# Patient Record
Sex: MALE | Race: OTHER | Employment: OTHER | URBAN - METROPOLITAN AREA
[De-identification: names, ages, dates, MRNs, and addresses within clinical notes are randomized per-mention and may not be internally consistent; named-entity substitution may affect disease eponyms.]

---

## 2017-01-30 ENCOUNTER — OFFICE VISIT (OUTPATIENT)
Dept: RHEUMATOLOGY | Age: 67
End: 2017-01-30

## 2017-01-30 VITALS
SYSTOLIC BLOOD PRESSURE: 150 MMHG | BODY MASS INDEX: 26.17 KG/M2 | HEIGHT: 70 IN | WEIGHT: 182.8 LBS | OXYGEN SATURATION: 97 % | DIASTOLIC BLOOD PRESSURE: 95 MMHG | HEART RATE: 88 BPM | TEMPERATURE: 98 F

## 2017-01-30 DIAGNOSIS — M35.3 PMR (POLYMYALGIA RHEUMATICA) (HCC): Primary | ICD-10-CM

## 2017-01-30 RX ORDER — CAL/D3/MAG11/ZINC/COP/MANG/BOR 600 MG-800
TABLET ORAL
Refills: 3 | COMMUNITY
Start: 2016-12-12 | End: 2019-10-23 | Stop reason: CLARIF

## 2017-01-30 RX ORDER — PREDNISONE 1 MG/1
TABLET ORAL
Refills: 3 | COMMUNITY
Start: 2017-01-20 | End: 2017-09-05 | Stop reason: ALTCHOICE

## 2017-01-30 NOTE — PROGRESS NOTES
RHEUMATOLOGY PROBLEM LIST AND CHIEF COMPLAINT  1. Polymyalgia Rheumatica - arthralgia and stiffness of shoulders, back, knee, and hands. Elevated CRP. INTERVAL HISTORY  This is a 77 y.o.  male. Today, the patient complains of pain in the joints. Severity: 2 on a scale of 0-10  Timing: all day   Context/Associated signs and symptoms: The patient states that he feels the same today. He complains of soreness and stiffness in his shoulders, back, hands and legs, but his hand and back symptoms are not daily. He complains that he is still having occasional headaches. He notes that his nightly shoulder pain is his worse pain, causing him trouble to sleep on his sides. He continues on prednisone 8 mg daily with relief, but wants to get off prednisone. Her did not taper a directed on previous visit. RHEUMATOLOGY REVIEW OF SYSTEMS   Positives as per history  Negatives as follows:  Meghna Jadiel:  Denies unexplained persistent fevers or weight change  RESPIRATORY:  No pleuritic pain, exertional dyspnea  CARDIOVASCULAR:  Denies chest pain  GASTRO:   Denies heartburn, abdominal pain, nausea, vomiting, diarrhea  SKIN:    Denies rash   MSK:    No persistent joint swelling    PAST MEDICAL HISTORY  Reviewed with patient, significant changes in medical history - no    FAMILY HISTORY  None    PHYSICAL EXAM  Blood pressure (!) 150/95, pulse 88, temperature 98 °F (36.7 °C), temperature source Oral, height 5' 10\" (1.778 m), weight 182 lb 12.8 oz (82.9 kg), SpO2 97 %. GENERAL APPEARANCE: Well-nourished, no acute distress  NECK: No adenopathy  ENT: No oral ulcers  CARDIOVASCULAR: Heart rhythm is regular. No murmur, rub, gallop  CHEST: Normal vesicular breath sounds. No wheezes, rales, pleural friction rubs  ABDOMINAL: The abdomen is soft and nontender.  Bowel sounds are normal  SKIN: No rash, palpable purpura, digital ulcer, abnormal thickening   MUSCULOSKELETAL:   Upper extremities - full range of motion, no tenderness, no swelling, no synovial thickening and no deformity of joints  Lower extremities - full range of motion, no tenderness, no swelling, no synovial thickening and no deformity of joints     LABS, RADIOLOGY AND PROCEDURES  Previous labs reviewed -Yes    ASSESSMENT  1. Polymyalgia Rheumatica - (Establised problem - Progressive disease) - The patient has responded to prednisone. He still has some morning stiffness, arthralgia, and headaches that occur intermittently. I explained that his current dose of prednisone is not very high and that he should not experience many side effects at his current dose. However, I would like for him to taper his prednisone to 5 mg daily or less, so I want him to taper his prednisone to 7 mg daily starting tomorrow. He should then taper this by 1 mg every 2 weeks. I explained that if he begins to have worsening pain, morning stiffness, and fatigue while tapering his prednisone he should call to discuss this. If he is unable to tolerate his prednisone, I will likely start him on stronger medication. I recommended that he continue calcium and vitamin D for his bone health since he is on long term steroids. He should return in 1 month for a follow up. PLAN  1. Prednisone 7 mg daily for 2 weeks then taper by 1 mg q2 weeks   2. Calcium and Vitamin D  3. Return in 1 month    Viji Brasher MD, [address] [place] [address]   Adult and Pediatric Rheumatology     [address] [place] [address], Phone [phone], Fax [phone]     Visiting  of [address] [place] [address] Ngozi Wesley Brock [address], Phone [phone], Fax [phone]    cc:  Tolu Holder MD    Written by venessa Wen, as dictated by Rut Wheeler.  Virgil Brasher M.D.

## 2017-01-30 NOTE — PATIENT INSTRUCTIONS
Prednisone 7mg x 2 weeks  Prednisone 6mg x 2 weeks  Prednisone 5mg x 2 weeks  Prednisone 4mg x 2 weeks  Prednisone 3mg x 2 weeks  Prednisone 2mg x 2 weeks  Prednisone 1mg x 2 weeks

## 2017-01-30 NOTE — MR AVS SNAPSHOT
Visit Information Date & Time Provider Department Dept. Phone Encounter #  
 1/30/2017  1:20 PM Doc Wilson MD [facility] [id number] Follow-up Instructions Return in about 1 month (around 2/28/2017). Upcoming Health Maintenance Date Due DTaP/Tdap/Td series (1 - Tdap) 11/14/1971 FOBT Q 1 YEAR AGE 50-75 11/14/2000 ZOSTER VACCINE AGE 60> 11/14/2010 GLAUCOMA SCREENING Q2Y 11/14/2015 Pneumococcal 65+ Low/Medium Risk (1 of 2 - PCV13) 11/14/2015 MEDICARE YEARLY EXAM 11/14/2015 INFLUENZA AGE 9 TO ADULT 8/1/2016 Allergies as of 1/30/2017  Review Complete On: 1/30/2017 By: Ashlie Singh LPN Severity Noted Reaction Type Reactions Codeine  09/02/2016    Hives Current Immunizations  Never Reviewed No immunizations on file. Not reviewed this visit Vitals BP Pulse Temp Height(growth percentile) Weight(growth percentile) SpO2  
 (!) 150/95 (BP 1 Location: Right arm, BP Patient Position: Sitting) 88 98 °F (36.7 °C) (Oral) 5' 10\" (1.778 m) 182 lb 12.8 oz (82.9 kg) 97% BMI Smoking Status 26.23 kg/m2 Former Smoker Vitals History BMI and BSA Data Body Mass Index Body Surface Area  
 26.23 kg/m 2 2.02 m 2 Preferred Pharmacy Pharmacy Name Phone [id number] [phone] [facility], [address] [place] [address]  [phone] Your Updated Medication List  
  
   
This list is accurate as of: 1/30/17  1:30 PM.  Always use your most recent med list.  
  
  
  
  
 ALPRAZolam 0.25 mg tablet Commonly known as:  XANAX  
TAKE 1 TABLET BY MOUTH TWICE A DAY  
  
 CALTRATE 600+D PLUS MINERALS 600 mg calcium- 800 unit-50 mg Tab Generic drug:  Ca-D3-mag#11-zinc-cupr-man-bor TAKE 1 TABLET BY MOUTH TWICE A DAY  
  
 celecoxib 200 mg capsule Commonly known as:  CELEBREX  
TAKE ONE CAPSULE BY MOUTH EVERY DAY  
  
 omeprazole 40 mg capsule Commonly known as:  PRILOSEC  
TAKE 1 CAPSULE BY MOUTH EVERY MORNING BEFORE MEALS  
  
 predniSONE 1 mg tablet Commonly known as:  DELTASONE  
TAKE 8 TABLETS BY MOUTH DAILY STIOLTO RESPIMAT 2.5-2.5 mcg/actuation Mist  
Generic drug:  tiotropium-olodaterol USE 2 INHALATIONS EVERY DAY Follow-up Instructions Return in about 1 month (around 2/28/2017). Patient Instructions Prednisone 7mg x 2 weeks Prednisone 6mg x 2 weeks Prednisone 5mg x 2 weeks Prednisone 4mg x 2 weeks Prednisone 3mg x 2 weeks Prednisone 2mg x 2 weeks Prednisone 1mg x 2 weeks Please provide this summary of care documentation to your next provider. Your primary care clinician is listed as MISTY Hilario. If you have any questions after today's visit, please call [phone].

## 2017-03-01 ENCOUNTER — OFFICE VISIT (OUTPATIENT)
Dept: RHEUMATOLOGY | Age: 67
End: 2017-03-01

## 2017-03-01 VITALS
OXYGEN SATURATION: 98 % | RESPIRATION RATE: 18 BRPM | HEART RATE: 65 BPM | WEIGHT: 184.2 LBS | DIASTOLIC BLOOD PRESSURE: 68 MMHG | HEIGHT: 70 IN | BODY MASS INDEX: 26.37 KG/M2 | SYSTOLIC BLOOD PRESSURE: 143 MMHG | TEMPERATURE: 97 F

## 2017-03-01 DIAGNOSIS — M35.3 PMR (POLYMYALGIA RHEUMATICA) (HCC): ICD-10-CM

## 2017-03-01 DIAGNOSIS — S43.422A SPRAIN OF LEFT ROTATOR CUFF CAPSULE, INITIAL ENCOUNTER: Primary | ICD-10-CM

## 2017-03-01 RX ORDER — GLUCOSAMINE SULFATE 1500 MG
1000 POWDER IN PACKET (EA) ORAL DAILY
COMMUNITY

## 2017-03-01 NOTE — MR AVS SNAPSHOT
Visit Information Date & Time Provider Department Dept. Phone Encounter #  
 3/1/2017  8:20 AM Oren Castellanos MD [facility] [id number] Upcoming Health Maintenance Date Due DTaP/Tdap/Td series (1 - Tdap) 11/14/1971 FOBT Q 1 YEAR AGE 50-75 11/14/2000 ZOSTER VACCINE AGE 60> 11/14/2010 GLAUCOMA SCREENING Q2Y 11/14/2015 Pneumococcal 65+ Low/Medium Risk (1 of 2 - PCV13) 11/14/2015 MEDICARE YEARLY EXAM 11/14/2015 INFLUENZA AGE 9 TO ADULT 8/1/2016 Allergies as of 3/1/2017  Review Complete On: 3/1/2017 By: Katharina Younger LPN Severity Noted Reaction Type Reactions Codeine  09/02/2016    Hives Current Immunizations  Never Reviewed No immunizations on file. Not reviewed this visit You Were Diagnosed With   
  
 Codes Comments Sprain of left rotator cuff capsule, initial encounter    -  Primary ICD-10-CM: M61.781U ICD-9-CM: 840.4 PMR (polymyalgia rheumatica) (HCC)     ICD-10-CM: M35.3 ICD-9-CM: 373 Vitals BP  
  
  
  
  
  
 143/68 (BP 1 Location: Right arm, BP Patient Position: Sitting) Vitals History BMI and BSA Data Body Mass Index Body Surface Area  
 26.43 kg/m 2 2.03 m 2 Preferred Pharmacy Pharmacy Name Phone [id number] [phone] [facility], [address] [place] [address]  [phone] Your Updated Medication List  
  
   
This list is accurate as of: 3/1/17  9:08 AM.  Always use your most recent med list.  
  
  
  
  
 ALPRAZolam 0.25 mg tablet Commonly known as:  XANAX  
TAKE 1 TABLET BY MOUTH TWICE A DAY  
  
 CALTRATE 600+D PLUS MINERALS 600 mg calcium- 800 unit-50 mg Tab Generic drug:  Ca-D3-mag#11-zinc-cupr-man-bereket No Longer Taking  
  
 celecoxib 200 mg capsule Commonly known as:  CELEBREX  
TAKE ONE CAPSULE BY MOUTH EVERY DAY  
  
 omeprazole 40 mg capsule Commonly known as:  PRILOSEC  
 TAKE 1 CAPSULE BY MOUTH EVERY MORNING BEFORE MEALS  
  
 predniSONE 1 mg tablet Commonly known as:  Merle Torrey Takes 5 mg Daily STIOLTO RESPIMAT 2.5-2.5 mcg/actuation Mist  
Generic drug:  tiotropium-olodaterol USE 2 INHALATIONS EVERY DAY  
  
 VITAMIN D3 1,000 unit Cap Generic drug:  cholecalciferol Take 1,000 Units by mouth daily. We Performed the Following REFERRAL TO PHYSICAL THERAPY [XDN89 Custom] Dominga Restrepo [address] [address] / [phone] [address] [facility] [address] [facility], [address] [facility] / [phone] [address] [place] [address] [address] [facility] / [phone] [address], [facility] / [phone] Referral Information Referral ID Referred By Referred To  
  
 [id number] ASI[place], [address] [facility] [place]   
   [address] [address] Phone: [phone] Fax: [phone] Visits Status Start Date End Date 1 New Request 3/1/17 3/1/18 If your referral has a status of pending review or denied, additional information will be sent to support the outcome of this decision. Please provide this summary of care documentation to your next provider. Your primary care clinician is listed as MISTY Hilario. If you have any questions after today's visit, please call [phone].

## 2017-03-01 NOTE — LETTER
NOTIFICATION RETURN TO WORK / SCHOOL 
 
3/1/2017 9:15 AM 
 
 
 
Mr. Amanda Blanton 
[id number] [facility] [phone] To Whom It May Concern: 
 
Amanda Blanton is currently under the care of Smita De Jesus. He will return to work/school on: March 2, 2017. If there are questions or concerns please have the patient contact our office. Sincerely, Luis Oconnor MD

## 2017-03-01 NOTE — PROGRESS NOTES
RHEUMATOLOGY PROBLEM LIST AND CHIEF COMPLAINT  1. Polymyalgia Rheumatica - arthralgia and stiffness of shoulders, back, knee, and hands. Elevated CRP. INTERVAL HISTORY  This is a 77 y.o.  male. Today, the patient complains of pain in the joints. Location: shoulder  Severity:  4 on a scale of 0-10  Timing: all day   Duration:  1 month  Modifying factors: prednisone  Context/Associated signs and symptoms: The patient has tapered his prednisone to 5 mg daily and states that he is feeling a little better. He complains that his left shoulder still has pain that is worse than his right shoulder and that he has trouble raising his left arm because of his shoulder pain. He also complains that he still has some pain in his hips. He notes that he has been bruising easily and reports that when he gets tired his eye sight gets \"a little fuzzy. \"    RHEUMATOLOGY REVIEW OF SYSTEMS   Positives as per history  Negatives as follows:  Savannahnorma Janettow:  Denies unexplained persistent fevers or weight change  RESPIRATORY:  No pleuritic pain, exertional dyspnea  CARDIOVASCULAR:  Denies chest pain  GASTRO:   Denies heartburn, abdominal pain, nausea, vomiting, diarrhea  SKIN:    Denies rash   MSK:    No persistent joint swelling    PAST MEDICAL HISTORY  Reviewed with patient, significant changes in medical history - no    FAMILY HISTORY   No autoimmune disease in the family    PHYSICAL EXAM  Blood pressure 143/68, pulse 65, temperature 97 °F (36.1 °C), temperature source Oral, resp. rate 18, height 5' 10\" (1.778 m), weight 184 lb 3.2 oz (83.6 kg), SpO2 98 %. GENERAL APPEARANCE: Well-nourished, no acute distress  NECK: No adenopathy  ENT: No oral ulcers  CARDIOVASCULAR: Heart rhythm is regular. No murmur, rub, gallop  CHEST: Normal vesicular breath sounds. No wheezes, rales, pleural friction rubs  ABDOMINAL: The abdomen is soft and nontender.  Bowel sounds are normal  SKIN: No rash, palpable purpura, digital ulcer, abnormal thickening   MUSCULOSKELETAL: Rotator cuff signs of the left shoulder - unable to raise left arm above midline  Upper extremities - full range of motion, no tenderness, no swelling, no synovial thickening and no deformity of joints   Lower extremities - full range of motion, no tenderness, no swelling, no synovial thickening and no deformity of joints     LABS, RADIOLOGY AND PROCEDURES  Previous labs reviewed -Yes    ASSESSMENT  1. Polymyalgia Rheumatica - (Establised problem - Progressive disease) - The patient has responded to prednisone. He is doing well and has tapered his prednisone to 5 mg daily without flaring. After examining the patient, I believe that his left shoulder pain is from a rotator cuff injury, not his PMR. He should continue tapering his prednisone by 1 mg every 2 weeks and return in 1 month for a follow up. 2. Rotator cuff disease - The patient likely has a partial tear of his left rotator cuff as he has significant pain when moving his left and is unable to raise his left arm above the midline. I have recommended that he do physical therapy for this. I will also give him a steroid injection in his left shoulder today. PLAN  1. Prednisone continue to taper by 1 mg q2 weeks   2. Calcium and Vitamin D  3. Left shoulder steroid injection  4. Recommend physical therapy  5. Return in 1 month    Viji Bonilla MD, [address] [place] [address]   Adult and Pediatric Rheumatology     [address] [place] [address], Phone [phone], Fax [phone]     Visiting  of [address] [place] [address] Ngozi Grey [address], Phone [phone], Fax [phone]    cc:  Tatum Khoury MD    Written by venessa Shetty, as dictated by Gm Llanos. Frandy Bonilla M.D.     [facility]  OFFICE PROCEDURE PROGRESS NOTE        Chart reviewed for the following:   Bradly Ann, have reviewed the History, Physical and updated the Allergic reactions for [address] performed immediately prior to start of procedure:   I, Annabelle Rosales, have performed the following reviews on Tristan Mix prior to the start of the procedure:            * Patient was identified by name and date of birth   * Agreement on procedure being performed was verified  * Risks and Benefits explained to the patient  * Procedure site verified and marked as necessary  * Patient was positioned for comfort  * Consent was signed and verified     Time: 9:00 am      Date of procedure: 3/1/2017    Procedure performed by: Annabelle Rosales MD    Provider assisted by: none    Patient assisted by: self    How tolerated by patient: tolerated the procedure well with no complications    Post Procedural Pain Scale: 0 - No Hurt    Comments: none      PROCEDURE  After consent was obtained, using sterile technique the left shoulder was prepped and Depo-medrol 40 mg and 1ml of lidocaine was then injected and the needle withdrawn. The procedure was well tolerated. The patient is asked to continue to rest for 24 hours before resuming regular activities. It may be more painful for the first 1-2 days. Watch for fever, or increased swelling or persistent pain. Call or return to clinic prn if such symptoms occur.

## 2017-03-02 RX ORDER — LIDOCAINE HYDROCHLORIDE 10 MG/ML
1 INJECTION INFILTRATION; PERINEURAL ONCE
Qty: 1 VIAL | Refills: 0
Start: 2017-03-02 | End: 2017-03-02

## 2017-05-01 ENCOUNTER — OFFICE VISIT (OUTPATIENT)
Dept: RHEUMATOLOGY | Age: 67
End: 2017-05-01

## 2017-05-01 VITALS
HEART RATE: 68 BPM | DIASTOLIC BLOOD PRESSURE: 83 MMHG | SYSTOLIC BLOOD PRESSURE: 143 MMHG | OXYGEN SATURATION: 97 % | BODY MASS INDEX: 26.14 KG/M2 | TEMPERATURE: 98 F | WEIGHT: 182.6 LBS | RESPIRATION RATE: 18 BRPM | HEIGHT: 70 IN

## 2017-05-01 DIAGNOSIS — M19.011 PRIMARY OSTEOARTHRITIS OF BOTH SHOULDERS: ICD-10-CM

## 2017-05-01 DIAGNOSIS — M35.3 PMR (POLYMYALGIA RHEUMATICA) (HCC): Primary | ICD-10-CM

## 2017-05-01 DIAGNOSIS — M19.012 PRIMARY OSTEOARTHRITIS OF BOTH SHOULDERS: ICD-10-CM

## 2017-05-01 RX ORDER — ALBUTEROL SULFATE 90 UG/1
AEROSOL, METERED RESPIRATORY (INHALATION)
Refills: 5 | COMMUNITY
Start: 2017-03-28

## 2017-05-01 NOTE — PROGRESS NOTES
RHEUMATOLOGY PROBLEM LIST AND CHIEF COMPLAINT  1. Polymyalgia Rheumatica - arthralgia and stiffness of shoulders, back, knee, and hands. Elevated CRP. INTERVAL HISTORY  This is a 77 y.o.  male. Today, the patient complains of pain in the joints. Location: shoulder, wrist, lumbar spine  Severity:  3 on a scale of 0-10  Timing: all day   Duration:  1 month  Modifying factors: Depo-medrol  Context/Associated signs and symptoms: The patient states that his left shoulder has improved since his steroid injection. He has less pain in his shoulder in the mornings and he now has more range of motion. However, he notes that he is still unable to sleep on his left side because of his shoulder pain. He complains of pain in his lower back after activity and pain in his wrists. He states that he has pain throughout the day, but he believes that some of his pain is from his degenerative arthritis. He denies morning stiffness or chest pain. He admits to occasional headaches that do not persist for very long, but he is unsure if this temporal artery pain. He continues on prednisone 1 mg daily     RHEUMATOLOGY REVIEW OF SYSTEMS   Positives as per history  Negatives as follows:  CONSTITUTlONAL:  Denies unexplained persistent fevers or weight change  RESPIRATORY:  No pleuritic pain, exertional dyspnea  CARDIOVASCULAR:  Denies chest pain  GASTRO:   Denies heartburn, abdominal pain, nausea, vomiting, diarrhea  SKIN:    Denies rash   MSK:    No persistent joint swelling    PAST MEDICAL HISTORY  Reviewed with patient, significant changes in medical history - no    FAMILY HISTORY   No autoimmune disease in the family    PHYSICAL EXAM  Blood pressure 143/83, pulse 68, temperature 98 °F (36.7 °C), temperature source Oral, resp. rate 18, height 5' 10\" (1.778 m), weight 182 lb 9.6 oz (82.8 kg), SpO2 97 %.   GENERAL APPEARANCE: Well-nourished, no acute distress  NECK: No adenopathy  ENT: No oral ulcers  CARDIOVASCULAR: Heart rhythm is regular. No murmur, rub, gallop  CHEST: Normal vesicular breath sounds. No wheezes, rales, pleural friction rubs  ABDOMINAL: The abdomen is soft and nontender. Bowel sounds are normal  SKIN: No rash, palpable purpura, digital ulcer, abnormal thickening   MUSCULOSKELETAL: Rotator cuff signs of the left shoulder - improved  Upper extremities - full range of motion, no tenderness, no swelling, no synovial thickening and no deformity of joints   Lower extremities - full range of motion, no tenderness, no swelling, no synovial thickening and no deformity of joints     LABS, RADIOLOGY AND PROCEDURES  Previous labs reviewed -Yes    ASSESSMENT  1. Polymyalgia Rheumatica - (Establised problem - Stable disease) - The patient has responded to prednisone. He is doing well and has tapered his prednisone to 1 mg daily without flaring. The symptoms he complained of today sound more degenerative and I do not suspect that he is flaring, but I will check labs including his markers of inflammation to see if he is flaring. I want him to finish his prednisone taper after 2 weeks on 1 mg daily. If he believes that he is flaring, he should call and I will restart him on prednisone. I will call him about his follow up after reviewing the results of his labs. 2. Rotator cuff disease - The patient's previous left shoulder steroid injection provided some relief. I have recommended that he continue doing exercises for his shoulder. PLAN  1. Prednisone 1 mg daily, taper off in 2 weeks   2. Calcium and Vitamin D  3. Check CBC, CMP, markers of inflammation (ESR, CRP)  4. Recommended shoulder exercises  5. Will call patient to discuss results    Any Hernandez.  Jessica Mariee MD  Adult and Pediatric Rheumatology     [address] [place] [address], Phone [phone], Fax [phone]     Visiting  of Pediatrics    Department of Pediatrics, [address] [place] [address]  [address] [id number], [address] [place] [address], Phone [phone], Fax [phone]    There are no Patient Instructions on file for this visit. cc:  Abrahan Leon MD    Written by venessa Collier, as dictated by Rosenda Fragoso.  Larry Gastelum M.D.

## 2017-05-01 NOTE — MR AVS SNAPSHOT
Visit Information Date & Time Provider Department Dept. Phone Encounter #  
 5/1/2017  8:00 AM Zonia Mendoza MD [facility] [id number] Follow-up Instructions Return if symptoms worsen or fail to improve. Upcoming Health Maintenance Date Due DTaP/Tdap/Td series (1 - Tdap) 11/14/1971 FOBT Q 1 YEAR AGE 50-75 11/14/2000 ZOSTER VACCINE AGE 60> 11/14/2010 GLAUCOMA SCREENING Q2Y 11/14/2015 Pneumococcal 65+ Low/Medium Risk (1 of 2 - PCV13) 11/14/2015 MEDICARE YEARLY EXAM 11/14/2015 INFLUENZA AGE 9 TO ADULT 8/1/2017 Allergies as of 5/1/2017  Review Complete On: 5/1/2017 By: Juno Caro LPN Severity Noted Reaction Type Reactions Codeine  09/02/2016    Hives Current Immunizations  Never Reviewed No immunizations on file. Not reviewed this visit You Were Diagnosed With   
  
 Codes Comments PMR (polymyalgia rheumatica) ([facility])    -  Primary ICD-10-CM: M35.3 ICD-9-CM: 854 Vitals BP Pulse Temp Resp Height(growth percentile) Weight(growth percentile) 143/83 (BP 1 Location: Left arm, BP Patient Position: Sitting) 68 98 °F (36.7 °C) (Oral) 18 5' 10\" (1.778 m) 182 lb 9.6 oz (82.8 kg) SpO2 BMI Smoking Status 97% 26.2 kg/m2 Former Smoker Vitals History BMI and BSA Data Body Mass Index Body Surface Area  
 26.2 kg/m 2 2.02 m 2 Preferred Pharmacy Pharmacy Name Phone [id number] [address] [facility], [address] [place] [address]  [phone] Your Updated Medication List  
  
   
This list is accurate as of: 5/1/17  8:30 AM.  Always use your most recent med list.  
  
  
  
  
 ALPRAZolam 0.25 mg tablet Commonly known as:  XANAX  
TAKE 1 TABLET BY MOUTH TWICE A DAY  
  
 CALTRATE 600+D PLUS MINERALS 600 mg calcium- 800 unit-50 mg Tab Generic drug:  Ca-D3-mag#11-zinc-cupr-man-bor No Longer Taking  
  
 celecoxib 200 mg capsule Commonly known as:  CELEBREX  
TAKE ONE CAPSULE BY MOUTH EVERY DAY  
  
 omeprazole 40 mg capsule Commonly known as:  PRILOSEC  
TAKE 1 CAPSULE BY MOUTH EVERY MORNING BEFORE MEALS  
  
 predniSONE 1 mg tablet Commonly known as:  Olivia Lucero Takes 1 mg Daily PROAIR HFA 90 mcg/actuation inhaler Generic drug:  albuterol USE 2 SPRAYS BY MOUTH EVERY 4 HOURS AS NEEDED STIOLTO RESPIMAT 2.5-2.5 mcg/actuation Mist  
Generic drug:  tiotropium-olodaterol USE 2 INHALATIONS EVERY DAY  
  
 VITAMIN D3 1,000 unit Cap Generic drug:  cholecalciferol Take 1,000 Units by mouth daily. We Performed the Following C REACTIVE PROTEIN, QT [96103 CPT(R)] CBC+PLATELET+HEM REVIEW [34788 CPT(R)] METABOLIC PANEL, COMPREHENSIVE [23851 CPT(R)] SED RATE (ESR) [id number] CPT(R)] Follow-up Instructions Return if symptoms worsen or fail to improve. Please provide this summary of care documentation to your next provider. Your primary care clinician is listed as MC Armendariz. If you have any questions after today's visit, please call [phone].

## 2017-05-02 LAB
ALBUMIN SERPL-MCNC: 4.3 G/DL (ref 3.6–4.8)
ALBUMIN/GLOB SERPL: 1.7 {RATIO} (ref 1.2–2.2)
ALP SERPL-CCNC: 56 IU/L (ref 39–117)
ALT SERPL-CCNC: 13 IU/L (ref 0–44)
AST SERPL-CCNC: 14 IU/L (ref 0–40)
BASOPHILS # BLD MANUAL: 0 X10E3/UL (ref 0–0.2)
BASOPHILS NFR BLD MANUAL: 0 %
BILIRUB SERPL-MCNC: 0.6 MG/DL (ref 0–1.2)
BUN SERPL-MCNC: 16 MG/DL (ref 8–27)
BUN/CREAT SERPL: 21 (ref 10–24)
CALCIUM SERPL-MCNC: 9.7 MG/DL (ref 8.6–10.2)
CHLORIDE SERPL-SCNC: 99 MMOL/L (ref 96–106)
CO2 SERPL-SCNC: 21 MMOL/L (ref 18–29)
CREAT SERPL-MCNC: 0.78 MG/DL (ref 0.76–1.27)
CRP SERPL-MCNC: 2.6 MG/L (ref 0–4.9)
DIFFERENTIAL COMMENT, 115260: NORMAL
EOSINOPHIL # BLD MANUAL: 0.1 X10E3/UL (ref 0–0.4)
EOSINOPHIL NFR BLD MANUAL: 1 %
ERYTHROCYTE [DISTWIDTH] IN BLOOD BY AUTOMATED COUNT: 14.1 % (ref 12.3–15.4)
ERYTHROCYTE [SEDIMENTATION RATE] IN BLOOD BY WESTERGREN METHOD: 11 MM/HR (ref 0–30)
GLOBULIN SER CALC-MCNC: 2.6 G/DL (ref 1.5–4.5)
GLUCOSE SERPL-MCNC: 110 MG/DL (ref 65–99)
HCT VFR BLD AUTO: 44.9 % (ref 37.5–51)
HGB BLD-MCNC: 15.3 G/DL (ref 12.6–17.7)
LYMPHOCYTES # BLD MANUAL: 0.9 X10E3/UL (ref 0.7–3.1)
LYMPHOCYTES NFR BLD MANUAL: 11 %
MCH RBC QN AUTO: 30.7 PG (ref 26.6–33)
MCHC RBC AUTO-ENTMCNC: 34.1 G/DL (ref 31.5–35.7)
MCV RBC AUTO: 90 FL (ref 79–97)
MONOCYTES # BLD MANUAL: 0.8 X10E3/UL (ref 0.1–0.9)
MONOCYTES NFR BLD MANUAL: 10 %
NEUTROPHILS # BLD MANUAL: 6.3 X10E3/UL (ref 1.4–7)
NEUTROPHILS NFR BLD MANUAL: 78 %
PLATELET # BLD AUTO: 324 X10E3/UL (ref 150–379)
PLATELET BLD QL SMEAR: ADEQUATE
POTASSIUM SERPL-SCNC: 4.7 MMOL/L (ref 3.5–5.2)
PROT SERPL-MCNC: 6.9 G/DL (ref 6–8.5)
RBC # BLD AUTO: 4.98 X10E6/UL (ref 4.14–5.8)
RBC MORPH BLD: NORMAL
SODIUM SERPL-SCNC: 137 MMOL/L (ref 134–144)
WBC # BLD AUTO: 8.1 X10E3/UL (ref 3.4–10.8)

## 2017-05-23 ENCOUNTER — TELEPHONE (OUTPATIENT)
Dept: RHEUMATOLOGY | Age: 67
End: 2017-05-23

## 2017-05-23 NOTE — TELEPHONE ENCOUNTER
----- Message from Mariama Diaz MD sent at 5/23/2017  8:05 AM EDT -----  Please let him know that his labs were normal.

## 2017-05-25 ENCOUNTER — OFFICE VISIT (OUTPATIENT)
Dept: RHEUMATOLOGY | Age: 67
End: 2017-05-25

## 2017-05-25 VITALS
RESPIRATION RATE: 16 BRPM | OXYGEN SATURATION: 97 % | DIASTOLIC BLOOD PRESSURE: 67 MMHG | HEART RATE: 82 BPM | HEIGHT: 70 IN | SYSTOLIC BLOOD PRESSURE: 142 MMHG | WEIGHT: 183.6 LBS | TEMPERATURE: 97.9 F | BODY MASS INDEX: 26.28 KG/M2

## 2017-05-25 DIAGNOSIS — M35.3 PMR (POLYMYALGIA RHEUMATICA) (HCC): Primary | ICD-10-CM

## 2017-05-25 RX ORDER — PREDNISONE 5 MG/1
5 TABLET ORAL DAILY
Qty: 30 TAB | Refills: 1 | Status: SHIPPED | OUTPATIENT
Start: 2017-05-25 | End: 2017-06-24

## 2017-05-25 NOTE — PROGRESS NOTES
RHEUMATOLOGY PROBLEM LIST AND CHIEF COMPLAINT  1. Polymyalgia Rheumatica - arthralgia and stiffness of shoulders, back, knee, and hands. Elevated CRP. INTERVAL HISTORY  This is a 77 y.o.  male. Today, the patient complains of pain in the joints. Location: shoulder, hand, cervical spine  Severity:  4 on a scale of 0-10  Timing: all day   Duration:  1 month  Modifying factors: Prednisone  Context/Associated signs and symptoms: The patient states that he has been off prednisone for 3 weeks. He states that he now has fatigue and pain \"all over. \" He complains of pain in his shoulders, neck, and back and states that he has stiffness in his hands in the mornings. He notes that his stiffness improves throughout the day. RHEUMATOLOGY REVIEW OF SYSTEMS   Positives as per history  Negatives as follows:  Solmon Rack:  Denies unexplained persistent fevers or weight change  RESPIRATORY:  No pleuritic pain, exertional dyspnea  CARDIOVASCULAR:  Denies chest pain  GASTRO:   Denies heartburn, abdominal pain, nausea, vomiting, diarrhea  SKIN:    Denies rash   MSK:    No persistent joint swelling    PAST MEDICAL HISTORY  Reviewed with patient, significant changes in medical history - no    FAMILY HISTORY   No autoimmune disease in the family    PHYSICAL EXAM  Blood pressure 142/67, pulse 82, temperature 97.9 °F (36.6 °C), temperature source Oral, resp. rate 16, height 5' 10\" (1.778 m), weight 183 lb 9.6 oz (83.3 kg), SpO2 97 %. GENERAL APPEARANCE: Well-nourished, no acute distress  NECK: No adenopathy  ENT: No oral ulcers  CARDIOVASCULAR: Heart rhythm is regular. No murmur, rub, gallop  CHEST: Normal vesicular breath sounds. No wheezes, rales, pleural friction rubs  ABDOMINAL: The abdomen is soft and nontender.  Bowel sounds are normal  SKIN: No rash, palpable purpura, digital ulcer, abnormal thickening   MUSCULOSKELETAL: Rotator cuff signs of the left shoulder - improved  Upper extremities - Mild wrist swelling bilaterally  Lower extremities - full range of motion, no tenderness, no swelling, no synovial thickening and no deformity of joints     LABS, RADIOLOGY AND PROCEDURES  Previous labs reviewed -Yes    ASSESSMENT  1. Polymyalgia Rheumatica - (Establised problem - Progressive disease) - The patient tapered off prednisone, but he complained of more pain and morning stiffness today. He also had minimal wrist swelling on exam today. I suspect that he may be flaring, so I will check his markers of inflammation again today. He should restart prednisone 5 mg daily and when I call him to discuss his labs he should report his response to prednisone. He should return in 6 weeks for a follow up. 2. Rotator cuff disease - The patient's previous left shoulder steroid injection provided some relief. I have recommended that he continue doing exercises for his shoulder. PLAN  1. Start prednisone 5 mg daily   2. Calcium and Vitamin D  3. Check ESR, CRP  4. Recommended shoulder exercises  5. Will call patient to discuss results  6. Return in 6 weeks    Viji Randle MD  Adult and Pediatric Rheumatology     [facility] [address], [address] [place] [address], Phone [phone], Fax [phone]     Visiting  of Pediatrics    Department of Pediatrics, [facility] of [address] [place] [address], [address] [place] [address], Phone [phone], Fax [phone]    There are no Patient Instructions on file for this visit. cc:  Pato Steve MD    Written by venessa Salmon, as dictated by Karin Chavez.  Karo Randle M.D.

## 2017-05-25 NOTE — MR AVS SNAPSHOT
Visit Information Date & Time Provider Department Dept. Phone Encounter #  
 5/25/2017  2:40 PM Mahesh Power MD Arthritis and [address] [place] [address] [id number] Follow-up Instructions Return in about 6 weeks (around 7/6/2017). Upcoming Health Maintenance Date Due DTaP/Tdap/Td series (1 - Tdap) 11/14/1971 FOBT Q 1 YEAR AGE 50-75 11/14/2000 ZOSTER VACCINE AGE 60> 11/14/2010 GLAUCOMA SCREENING Q2Y 11/14/2015 Pneumococcal 65+ Low/Medium Risk (1 of 2 - PCV13) 11/14/2015 MEDICARE YEARLY EXAM 11/14/2015 INFLUENZA AGE 9 TO ADULT 8/1/2017 Allergies as of 5/25/2017  Review Complete On: 5/25/2017 By: Adelfo Siddiqi LPN Severity Noted Reaction Type Reactions Codeine  09/02/2016    Hives Current Immunizations  Never Reviewed No immunizations on file. Not reviewed this visit You Were Diagnosed With   
  
 Codes Comments PMR (polymyalgia rheumatica) (HCC)    -  Primary ICD-10-CM: M35.3 ICD-9-CM: 703 Vitals BP Pulse Temp Resp Height(growth percentile) Weight(growth percentile) 142/67 (BP 1 Location: Right arm, BP Patient Position: Sitting) 82 97.9 °F (36.6 °C) (Oral) 16 5' 10\" (1.778 m) 183 lb 9.6 oz (83.3 kg) SpO2 BMI Smoking Status 97% 26.34 kg/m2 Former Smoker Vitals History BMI and BSA Data Body Mass Index Body Surface Area  
 26.34 kg/m 2 2.03 m 2 Preferred Pharmacy Pharmacy Name Phone [id number] [address] [facility], [address] [place] [address]  [phone] Your Updated Medication List  
  
   
This list is accurate as of: 5/25/17  3:21 PM.  Always use your most recent med list.  
  
  
  
  
 ALPRAZolam 0.25 mg tablet Commonly known as:  XANAX  
TAKE 1 TABLET BY MOUTH TWICE A DAY  
  
 CALTRATE 600+D PLUS MINERALS 600 mg calcium- 800 unit-50 mg Tab Generic drug:  Ca-D3-mag#11-zinc-cupr-man-bor No Longer Taking  
  
 celecoxib 200 mg capsule Commonly known as:  CELEBREX Take 1 Capsile Twice Daily  
  
 omeprazole 40 mg capsule Commonly known as:  PRILOSEC  
TAKE 1 CAPSULE BY MOUTH EVERY MORNING BEFORE MEALS * predniSONE 1 mg tablet Commonly known as:  Zeny Schilder No Longer Taking * predniSONE 5 mg tablet Commonly known as:  Zeny Schilder Take 1 Tab by mouth daily for 30 days. PROAIR HFA 90 mcg/actuation inhaler Generic drug:  albuterol USE 2 SPRAYS BY MOUTH EVERY 4 HOURS AS NEEDED STIOLTO RESPIMAT 2.5-2.5 mcg/actuation Mist  
Generic drug:  tiotropium-olodaterol USE 2 INHALATIONS EVERY DAY  
  
 VITAMIN D3 1,000 unit Cap Generic drug:  cholecalciferol Take 1,000 Units by mouth daily. * Notice: This list has 2 medication(s) that are the same as other medications prescribed for you. Read the directions carefully, and ask your doctor or other care provider to review them with you. Prescriptions Sent to Pharmacy Refills  
 predniSONE (DELTASONE) 5 mg tablet 1 Sig: Take 1 Tab by mouth daily for 30 days. Class: Normal  
 Pharmacy: [id number]- [id number] [facility], [address] [place] [address] Dr Ph #: [phone] Route: Oral  
  
We Performed the Following C REACTIVE PROTEIN, QT [14114 CPT(R)] SED RATE (ESR) [id number] CPT(R)] Follow-up Instructions Return in about 6 weeks (around 7/6/2017). Please provide this summary of care documentation to your next provider. Your primary care clinician is listed as [facility]. If you have any questions after today's visit, please call [phone].

## 2017-05-26 LAB
CRP SERPL-MCNC: 2.1 MG/L (ref 0–4.9)
ERYTHROCYTE [SEDIMENTATION RATE] IN BLOOD BY WESTERGREN METHOD: 11 MM/HR (ref 0–30)

## 2017-06-05 ENCOUNTER — TELEPHONE (OUTPATIENT)
Dept: RHEUMATOLOGY | Age: 67
End: 2017-06-05

## 2017-07-21 RX ORDER — PREDNISONE 5 MG/1
5 TABLET ORAL DAILY
Qty: 30 TAB | Refills: 0 | Status: SHIPPED | OUTPATIENT
Start: 2017-07-21 | End: 2017-09-05 | Stop reason: ALTCHOICE

## 2017-07-21 NOTE — TELEPHONE ENCOUNTER
----- Message from Yanelis Gupta sent at 7/21/2017  1:51 PM EDT -----  Regarding: FW: Dr. Dutch Calzada / telephone      ----- Message -----     From: Joss Steinberg     Sent: 7/21/2017  12:31 PM       To: [facility]  Subject: Dr. Dutch Calzada / telephone                            Clearance Shade with [facility] pharmacy on [address] [place] [address] requesting a refill on the pt's \"Prednosone 5 mg\" And best contact number [phone].

## 2017-09-05 ENCOUNTER — OFFICE VISIT (OUTPATIENT)
Dept: RHEUMATOLOGY | Age: 67
End: 2017-09-05

## 2017-09-05 VITALS
RESPIRATION RATE: 16 BRPM | HEART RATE: 59 BPM | OXYGEN SATURATION: 97 % | WEIGHT: 180 LBS | TEMPERATURE: 97.4 F | HEIGHT: 70 IN | BODY MASS INDEX: 25.77 KG/M2 | SYSTOLIC BLOOD PRESSURE: 145 MMHG | DIASTOLIC BLOOD PRESSURE: 82 MMHG

## 2017-09-05 DIAGNOSIS — M35.3 PMR (POLYMYALGIA RHEUMATICA) (HCC): Primary | ICD-10-CM

## 2017-09-05 RX ORDER — PREDNISONE 1 MG/1
4 TABLET ORAL DAILY
Qty: 120 TAB | Refills: 3 | Status: SHIPPED | OUTPATIENT
Start: 2017-09-05 | End: 2017-10-05

## 2017-09-05 NOTE — PATIENT INSTRUCTIONS
Prednisone 4mg daily x 2 weeks  Prednisone 3mg daily x 2 weeks  Prednisone 2mg daily x 2 weeks  Prednisone 1mg daily x 2 weeks

## 2017-09-05 NOTE — PROGRESS NOTES
RHEUMATOLOGY PROBLEM LIST AND CHIEF COMPLAINT  1. Polymyalgia Rheumatica - arthralgia and stiffness of shoulders, back, knee, and hands. Elevated CRP. INTERVAL HISTORY  This is a 77 y.o.  male. Today, the patient complains of pain in the joints. Location: shoulder, hand  Severity:  2 on a scale of 0-10  Timing: all day   Duration:  4 months  Context/Associated signs and symptoms: The patient complains of daily pain that worsens with activity and mentions some blurry vision. He continues on Medrol 4 mg daily. There have been no adverse effects to the current medication. He has no other complaints. He mentions no new medications or medical issues. RHEUMATOLOGY REVIEW OF SYSTEMS   Positives as per history  Negatives as follows:  Verneta Loop:  Denies unexplained persistent fevers or weight change  RESPIRATORY:  No pleuritic pain, exertional dyspnea  CARDIOVASCULAR:  Denies chest pain  GASTRO:   Denies heartburn, abdominal pain, nausea, vomiting, diarrhea  SKIN:    Denies rash   MSK:    No persistent joint swelling    PAST MEDICAL HISTORY  Reviewed with patient, significant changes in medical history - no    FAMILY HISTORY   No autoimmune disease in the family    PHYSICAL EXAM  Blood pressure 145/82, pulse (!) 59, temperature 97.4 °F (36.3 °C), temperature source Oral, resp. rate 16, height 5' 10\" (1.778 m), weight 180 lb (81.6 kg), SpO2 97 %. GENERAL APPEARANCE: Well-nourished, no acute distress  NECK: No adenopathy  ENT: No oral ulcers  CARDIOVASCULAR: Heart rhythm is regular. No murmur, rub, gallop  CHEST: Normal vesicular breath sounds. No wheezes, rales, pleural friction rubs  ABDOMINAL: The abdomen is soft and nontender.  Bowel sounds are normal  SKIN: No rash, palpable purpura, digital ulcer, abnormal thickening   MUSCULOSKELETAL: Rotator cuff signs of the left shoulder - improved  Upper extremities - full range of motion, no tenderness, no swelling, no synovial thickening and no deformity of joints   Lower extremities - full range of motion, no tenderness, no swelling, no synovial thickening and no deformity of joints     LABS, RADIOLOGY AND PROCEDURES  Previous labs reviewed -Yes    ASSESSMENT  1. Polymyalgia Rheumatica - (Establised problem - Progressive disease) - I suspect most of the patient's symptoms are a result of osteoarthritis. The patient's exam was normal.  I want the patient to start tapering his prednisone by 1 mg q2 weeks. He should return in 2 months for a follow up. He should contact me if his symptoms change or worsen. I recommend he see ophthalmology for evaluation of his blurry vision. 2. Rotator cuff disease - The patient's previous left shoulder steroid injection provided some relief. I have recommended that he continue doing exercises for his shoulder. PLAN  1. Prednisone 4 mg daily; taper 1 mg q2 weeks. 2. Calcium and Vitamin D  3. Recommend shoulder exercises  4. Return in 2 months    Viji Dueñas MD  Adult and Pediatric Rheumatology     [address] [place] [address], Phone [phone], Fax [phone]     Visiting  of Pediatrics    Department of Pediatrics, [facility] of [address] [place] [address], [address] [place] [address], Phone [phone], Fax [phone]    Patient Instructions   Prednisone 4mg daily x 2 weeks  Prednisone 3mg daily x 2 weeks  Prednisone 2mg daily x 2 weeks  Prednisone 1mg daily x 2 weeks      cc:  Familia Dennis MD    Written by venessa Rivas, as dictated by Hernandez Short.  Genie Dueñas M.D.

## 2017-09-05 NOTE — MR AVS SNAPSHOT
Visit Information Date & Time Provider Department Dept. Phone Encounter #  
 9/5/2017 10:30 AM Jaqui Steiner MD [address] [phone] [id number] Follow-up Instructions Return in about 2 months (around 11/5/2017). Your Appointments 9/5/2017 10:30 AM  
ESTABLISHED PATIENT with Jaqui Steiner MD  
[address] ([address]) Appt Note: [facility] med[facility] [address]  
[phone]  
  
   
 [facility] [address] [address] Upcoming Health Maintenance Date Due DTaP/Tdap/Td series (1 - Tdap) 11/14/1971 FOBT Q 1 YEAR AGE 50-75 11/14/2000 ZOSTER VACCINE AGE 60> 9/14/2010 GLAUCOMA SCREENING Q2Y 11/14/2015 Pneumococcal 65+ Low/Medium Risk (1 of 2 - PCV13) 11/14/2015 MEDICARE YEARLY EXAM 11/14/2015 INFLUENZA AGE 9 TO ADULT 8/1/2017 Allergies as of 9/5/2017  Review Complete On: 9/5/2017 By: Romain Jones LPN Severity Noted Reaction Type Reactions Codeine  09/02/2016    Hives Current Immunizations  Never Reviewed No immunizations on file. Not reviewed this visit You Were Diagnosed With   
  
 Codes Comments PMR (polymyalgia rheumatica) (HCC)    -  Primary ICD-10-CM: M35.3 ICD-9-CM: 413 Vitals BP Pulse Temp Resp Height(growth percentile) Weight(growth percentile) 145/82 (BP 1 Location: Right arm, BP Patient Position: Sitting) (!) 59 97.4 °F (36.3 °C) (Oral) 16 5' 10\" (1.778 m) 180 lb (81.6 kg) SpO2 BMI Smoking Status 97% 25.83 kg/m2 Former Smoker Vitals History BMI and BSA Data Body Mass Index Body Surface Area  
 25.83 kg/m 2 2.01 m 2 Preferred Pharmacy [facility] Name Phone [id number] [address], [address] [place] [address]  [phone] Your Updated Medication List  
  
   
 This list is accurate as of: 9/5/17 10:12 AM.  Always use your most recent med list.  
  
  
  
  
 ALPRAZolam 0.25 mg tablet Commonly known as:  XANAX  
TAKE 1 TABLET BY MOUTH TWICE A DAY  
  
 CALTRATE 600+D PLUS MINERALS 600 mg calcium- 800 unit-50 mg Tab Generic drug:  [id number]-zinc--moisés-bor No Longer Taking  
  
 celecoxib 200 mg capsule Commonly known as:  CELEBREX Take 1 Capsile Twice Daily  
  
 omeprazole 40 mg capsule Commonly known as:  PRILOSEC  
TAKE 1 CAPSULE BY MOUTH EVERY MORNING BEFORE MEALS  
  
 predniSONE 1 mg tablet Commonly known as:  [facility] Take 4 Tabs by mouth daily for 30 days. PROAIR HFA 90 mcg/actuation inhaler Generic drug:  albuterol USE 2 SPRAYS BY MOUTH EVERY 4 HOURS AS NEEDED STIOLTO RESPIMAT 2.5-2.5 mcg/actuation Mist  
Generic drug:  tiotropium-olodaterol USE 2 INHALATIONS EVERY DAY  
  
 VITAMIN D3 1,000 unit Cap Generic drug:  cholecalciferol Take 1,000 Units by mouth daily. Prescriptions Sent to Pharmacy Refills  
 predniSONE (DELTASONE) 1 mg tablet 3 Sig: Take 4 Tabs by mouth daily for 30 days. Class: Normal  
 Pharmacy: [id number]- [address] [facility], [address] [place] [address]  Ph #: [phone] Route: Oral  
  
Follow-up Instructions Return in about 2 months (around 11/5/2017). Patient Instructions Prednisone 4mg daily x 2 weeks Prednisone 3mg daily x 2 weeks Prednisone 2mg daily x 2 weeks Prednisone 1mg daily x 2 weeks Introducing \[facility]\"" & HEALTH SERVICES! Hua Carvajal introduces [employer] patient portal. Now you can access parts of your medical record, email your doctor's office, and request medication refills online. 1. In your internet browser, go to [email or web address] 2. Click on the First Time User? Click Here link in the Sign In box. You will see the New Member Sign Up page. 3. Enter your [employer] Access Code exactly as it appears below.  You will not need to use this code after youve completed the sign-up process. If you do not sign up before the expiration date, you must request a new code. · [employer] Access Code: [email or web address] Expires: 12/4/2017  9:58 AM 
 
4. Enter the last four digits of your Social Security Number (xxxx) and Date of Birth (mm/dd/yyyy) as indicated and click Submit. You will be taken to the next sign-up page. 5. Create a [facility] ID. This will be your [employer] login ID and cannot be changed, so think of one that is secure and easy to remember. 6. Create a [employer] [facility] password. You can change your password at any time. 7. Enter your Password Reset Question and Answer. This can be used at a later time if you forget your password. 8. Enter your e-mail address. You will receive e-mail notification when new information is available in [address] 9. Click Sign Up. You can now view and download portions of your medical record. 10. Click the Download Summary menu link to download a portable copy of your medical information. If you have questions, please visit the Frequently Asked Questions section of the [employer] website. Remember, [facility] is NOT to be used for urgent needs. For medical emergencies, dial 911. Now available from your iPhone and Android! Please provide this summary of care documentation to your next provider. Your primary care clinician is listed as Lary Boas [place]. If you have any questions after today's visit, please call [phone].

## 2017-11-02 ENCOUNTER — OFFICE VISIT (OUTPATIENT)
Dept: RHEUMATOLOGY | Age: 67
End: 2017-11-02

## 2017-11-02 VITALS
TEMPERATURE: 98 F | OXYGEN SATURATION: 96 % | DIASTOLIC BLOOD PRESSURE: 71 MMHG | HEIGHT: 70 IN | RESPIRATION RATE: 18 BRPM | SYSTOLIC BLOOD PRESSURE: 115 MMHG | HEART RATE: 64 BPM | WEIGHT: 177.8 LBS | BODY MASS INDEX: 25.45 KG/M2

## 2017-11-02 DIAGNOSIS — M35.3 PMR (POLYMYALGIA RHEUMATICA) (HCC): Primary | ICD-10-CM

## 2017-11-02 RX ORDER — PREDNISONE 1 MG/1
3 TABLET ORAL DAILY
Qty: 90 TAB | Refills: 0 | Status: SHIPPED | OUTPATIENT
Start: 2017-11-02 | End: 2019-10-23 | Stop reason: CLARIF

## 2017-11-02 RX ORDER — PREDNISONE 1 MG/1
TABLET ORAL
Refills: 3 | COMMUNITY
Start: 2017-10-13 | End: 2019-10-23 | Stop reason: CLARIF

## 2017-11-02 NOTE — PROGRESS NOTES
RHEUMATOLOGY PROBLEM LIST AND CHIEF COMPLAINT  1. Polymyalgia Rheumatica - arthralgia and stiffness of shoulders, back, knee, and hands. Elevated CRP. INTERVAL HISTORY  This is a 77 y.o.  male. Today, the patient complains of pain in the joints. Location: hand  Severity:  2 on a scale of 0-10  Timing: all day   Duration:  1 months  Context/Associated signs and symptoms: The patient is on his last week of prednisone 1 mg daily. He complains of stiffness of hands but denies pain. He states his shoulders do not bother him. He mentions no new medications or medical issues. RHEUMATOLOGY REVIEW OF SYSTEMS   Positives as per history  Negatives as follows:  Nichole Cecy:  Denies unexplained persistent fevers or weight change  RESPIRATORY:  No pleuritic pain, exertional dyspnea  CARDIOVASCULAR:  Denies chest pain  GASTRO:   Denies heartburn, abdominal pain, nausea, vomiting, diarrhea  SKIN:    Denies rash   MSK:    No persistent joint swelling    PAST MEDICAL HISTORY  Reviewed with patient, significant changes in medical history - no    FAMILY HISTORY   No autoimmune disease in the family    PHYSICAL EXAM  Blood pressure 115/71, pulse 64, temperature 98 °F (36.7 °C), temperature source Oral, resp. rate 18, height 5' 10\" (1.778 m), weight 177 lb 12.8 oz (80.6 kg), SpO2 96 %. GENERAL APPEARANCE: Well-nourished, no acute distress  NECK: No adenopathy  ENT: No oral ulcers  CARDIOVASCULAR: Heart rhythm is regular. No murmur, rub, gallop  CHEST: Normal vesicular breath sounds. No wheezes, rales, pleural friction rubs  ABDOMINAL: The abdomen is soft and nontender.  Bowel sounds are normal  SKIN: No rash, palpable purpura, digital ulcer, abnormal thickening   MUSCULOSKELETAL: Rotator cuff signs of the left shoulder - improved  Upper extremities - full range of motion, no tenderness, no swelling, no synovial thickening and no deformity of joints OA changes noted  Lower extremities - full range of motion, no tenderness, no swelling, no synovial thickening and no deformity of joints OA changes noted    LABS, RADIOLOGY AND PROCEDURES  Previous labs reviewed -Yes    ASSESSMENT  1. Polymyalgia Rheumatica - (Establised problem - Progressive disease) - The patient is tolerating his prednisone with minimal symptoms. He should continue with his taper. I believe his PMR has run its course. He should return as needed. 2. Rotator cuff disease - The patient's previous left shoulder steroid injection provided some relief. I have recommended that he continue doing exercises for his shoulder. PLAN  1. Prednisone 1 mg daily; taper 1 mg q2 weeks. 2. Calcium and Vitamin D  3. Recommend shoulder exercises  4. Follow up as needed    Viji Hitchcock MD  Adult and Pediatric Rheumatology     [facility] Arthritis and [address] [facility], [address] [place] [address], Phone [phone], Fax [phone]   E-mail: [email or web address]    Visiting  of Pediatrics    Department of Pediatrics, [facility] of [address] [place] [address], [address] [place] [address], Phone [phone], Fax [phone]  E-mail: [email or web address]    There are no Patient Instructions on file for this visit. cc:  Maggie Andrews MD    Written by venessa Dick, as dictated by Brenda Peck.  Zoe Hitchcock M.D.

## 2019-10-23 RX ORDER — FENTANYL 50 UG/1
1 PATCH TRANSDERMAL
COMMUNITY

## 2019-10-23 RX ORDER — DOCUSATE SODIUM 100 MG/1
100 CAPSULE, LIQUID FILLED ORAL 2 TIMES DAILY
COMMUNITY

## 2019-10-23 RX ORDER — GABAPENTIN 400 MG/1
400 CAPSULE ORAL 3 TIMES DAILY
COMMUNITY

## 2019-10-23 RX ORDER — IBUPROFEN 400 MG/1
TABLET ORAL
COMMUNITY

## 2019-10-23 RX ORDER — HYDROCODONE BITARTRATE AND ACETAMINOPHEN 5; 325 MG/1; MG/1
2 TABLET ORAL
COMMUNITY

## 2019-10-23 RX ORDER — TAMSULOSIN HYDROCHLORIDE 0.4 MG/1
0.4 CAPSULE ORAL DAILY
COMMUNITY

## 2019-10-23 NOTE — PERIOP NOTES
[address] [place] [address] Dr Gleason Preprocedure Instructions      1. On the day of your surgery, please report to registration located on the 2nd floor of the  [facility]. yes    2. You must have a responsible adult to drive you to the hospital, stay at the hospital during your procedure and drive you home. If they leave your procedure will not be started (no exceptions). yes    3. Do not have anything to eat or drink (including water, gum, mints, coffee, and juice) after midnight. This does not apply to the medications you were instructed to take by your physician. yesIf you are currently taking Plavix, Coumadin, Aspirin, or other blood-thinning agents, contact your physician for special instructions. not applicable,    4. If you are having a procedure that requires bowel prep: We recommend that you have only clear liquids the day before your procedure and begin your bowel prep by 5:00 pm.  You may continue to drink clear liquids until midnight. If for any reason you are not able to complete your prep please notify your physician immediately. not applicable    5. Have a list of all current medications, including vitamins, herbal supplements and any other over the counter medications. yes    6. If you wear glasses, contacts, dentures and/or hearing aids, they may be removed prior to procedure, please bring a case to store them in. yes    7. You should understand that if you do not follow these instructions your procedure may be cancelled. If your physical condition changes (I.e. fever, cold or flu) please contact your doctor as soon as possible. 8. It is important that you be on time.   If for any reason you are unable to keep your appointment please call [phone] the day of or your physicians office prior to your scheduled procedure

## 2019-10-25 ENCOUNTER — HOSPITAL ENCOUNTER (OUTPATIENT)
Age: 69
Setting detail: OUTPATIENT SURGERY
Discharge: HOME | End: 2019-10-25
Attending: INTERNAL MEDICINE | Admitting: INTERNAL MEDICINE
Payer: MEDICARE

## 2019-10-25 ENCOUNTER — ANESTHESIA EVENT (OUTPATIENT)
Dept: ENDOSCOPY | Age: 69
End: 2019-10-25
Payer: MEDICARE

## 2019-10-25 ENCOUNTER — ANESTHESIA (OUTPATIENT)
Dept: ENDOSCOPY | Age: 69
End: 2019-10-25
Payer: MEDICARE

## 2019-10-25 VITALS
RESPIRATION RATE: 13 BRPM | OXYGEN SATURATION: 99 % | WEIGHT: 160.72 LBS | HEIGHT: 70 IN | DIASTOLIC BLOOD PRESSURE: 44 MMHG | TEMPERATURE: 98 F | SYSTOLIC BLOOD PRESSURE: 111 MMHG | BODY MASS INDEX: 23.01 KG/M2 | HEART RATE: 57 BPM

## 2019-10-25 LAB
H PYLORI FROM TISSUE: NEGATIVE
KIT LOT NO., HCLOLOT: NORMAL
NEGATIVE CONTROL: NEGATIVE
POSITIVE CONTROL: POSITIVE

## 2019-10-25 PROCEDURE — 76060000031 HC ANESTHESIA FIRST 0.5 HR: Performed by: INTERNAL MEDICINE

## 2019-10-25 PROCEDURE — 74011250636 HC RX REV CODE- 250/636: Performed by: INTERNAL MEDICINE

## 2019-10-25 PROCEDURE — 74011250636 HC RX REV CODE- 250/636: Performed by: NURSE ANESTHETIST, CERTIFIED REGISTERED

## 2019-10-25 PROCEDURE — 74011250637 HC RX REV CODE- 250/637: Performed by: NURSE ANESTHETIST, CERTIFIED REGISTERED

## 2019-10-25 PROCEDURE — 87077 CULTURE AEROBIC IDENTIFY: CPT | Performed by: INTERNAL MEDICINE

## 2019-10-25 PROCEDURE — 77030021593 HC FCPS BIOP ENDOSC BSC -A: Performed by: INTERNAL MEDICINE

## 2019-10-25 PROCEDURE — 88305 TISSUE EXAM BY PATHOLOGIST: CPT

## 2019-10-25 PROCEDURE — 76040000019: Performed by: INTERNAL MEDICINE

## 2019-10-25 RX ORDER — NALOXONE HYDROCHLORIDE 0.4 MG/ML
0.4 INJECTION, SOLUTION INTRAMUSCULAR; INTRAVENOUS; SUBCUTANEOUS
Status: DISCONTINUED | OUTPATIENT
Start: 2019-10-25 | End: 2019-10-25 | Stop reason: HOSPADM

## 2019-10-25 RX ORDER — ALBUTEROL SULFATE 90 UG/1
AEROSOL, METERED RESPIRATORY (INHALATION) AS NEEDED
Status: DISCONTINUED | OUTPATIENT
Start: 2019-10-25 | End: 2019-10-25 | Stop reason: HOSPADM

## 2019-10-25 RX ORDER — PROPOFOL 10 MG/ML
INJECTION, EMULSION INTRAVENOUS AS NEEDED
Status: DISCONTINUED | OUTPATIENT
Start: 2019-10-25 | End: 2019-10-25 | Stop reason: HOSPADM

## 2019-10-25 RX ORDER — FLUMAZENIL 0.1 MG/ML
0.2 INJECTION INTRAVENOUS
Status: DISCONTINUED | OUTPATIENT
Start: 2019-10-25 | End: 2019-10-25 | Stop reason: HOSPADM

## 2019-10-25 RX ORDER — MIDAZOLAM HYDROCHLORIDE 1 MG/ML
.25-5 INJECTION, SOLUTION INTRAMUSCULAR; INTRAVENOUS
Status: DISCONTINUED | OUTPATIENT
Start: 2019-10-25 | End: 2019-10-25 | Stop reason: HOSPADM

## 2019-10-25 RX ORDER — ATROPINE SULFATE 0.1 MG/ML
0.4 INJECTION INTRAVENOUS
Status: DISCONTINUED | OUTPATIENT
Start: 2019-10-25 | End: 2019-10-25 | Stop reason: HOSPADM

## 2019-10-25 RX ORDER — SODIUM CHLORIDE 9 MG/ML
50 INJECTION, SOLUTION INTRAVENOUS CONTINUOUS
Status: DISCONTINUED | OUTPATIENT
Start: 2019-10-25 | End: 2019-10-25 | Stop reason: HOSPADM

## 2019-10-25 RX ORDER — DEXTROMETHORPHAN/PSEUDOEPHED 2.5-7.5/.8
1.2 DROPS ORAL
Status: DISCONTINUED | OUTPATIENT
Start: 2019-10-25 | End: 2019-10-25 | Stop reason: HOSPADM

## 2019-10-25 RX ORDER — SODIUM CHLORIDE 9 MG/ML
INJECTION, SOLUTION INTRAVENOUS
Status: DISCONTINUED | OUTPATIENT
Start: 2019-10-25 | End: 2019-10-25 | Stop reason: HOSPADM

## 2019-10-25 RX ORDER — EPINEPHRINE 0.1 MG/ML
1 INJECTION INTRACARDIAC; INTRAVENOUS
Status: DISCONTINUED | OUTPATIENT
Start: 2019-10-25 | End: 2019-10-25 | Stop reason: HOSPADM

## 2019-10-25 RX ADMIN — ALBUTEROL SULFATE 2 PUFF: 90 AEROSOL, METERED RESPIRATORY (INHALATION) at 09:21

## 2019-10-25 RX ADMIN — PROPOFOL 50 MG: 10 INJECTION, EMULSION INTRAVENOUS at 09:23

## 2019-10-25 RX ADMIN — PROPOFOL 50 MG: 10 INJECTION, EMULSION INTRAVENOUS at 09:30

## 2019-10-25 RX ADMIN — SODIUM CHLORIDE 50 ML/HR: 900 INJECTION, SOLUTION INTRAVENOUS at 07:50

## 2019-10-25 RX ADMIN — PROPOFOL 50 MG: 10 INJECTION, EMULSION INTRAVENOUS at 09:26

## 2019-10-25 RX ADMIN — SODIUM CHLORIDE: 9 INJECTION, SOLUTION INTRAVENOUS at 09:10

## 2019-10-25 NOTE — ROUTINE PROCESS
Kaleigh Choudhury 
1950 
[id number] Situation: 
Verbal report received from: Lulú Buckner RN Procedure: Procedure(s): ESOPHAGOGASTRODUODENOSCOPY (EGD) Background: 
 
Preoperative diagnosis: EPIGASTRIC PAIN Postoperative diagnosis: mild gastritis :  Dr. Romi Talley Assistant(s): Endoscopy Technician-1: Darrius Kramer Endoscopy RN-1: Sommer Francisco Specimens:  
ID Type Source Tests Collected by Time Destination 1 : gastric biopsies Preservative   Sofía Harden MD 10/25/2019 0945 Pathology H. Pylori  yes Assessment: 
Intra-procedure medications Anesthesia gave intra-procedure sedation and medications, see anesthesia flow sheet yes Intravenous fluids: NS@ Olivia Screws Vital signs stable Abdominal assessment: round and soft Recommendation: 
Discharge patient per MD order. Family or Friend Permission to share finding with family or friend yes Endoscopy discharge instructions have been reviewed and given to patient and spouse. The patient and spouse verbalized understanding and acceptance of instructions.

## 2019-10-25 NOTE — H&P
The patient is a 76year old male who presents with a complaint of Abdominal Pain. The patient presents for consultation at the request of . The onset of the abdominal pain has been chronic and has been occurring in an intermittent pattern for weeks with a recurrent course . The abdominal pain is described as moderate crampy and pressure sensation and  is described as being located in the epigastrium .  The abdominal pain radiates to the left flank and right flank. The symptoms are aggravated by meals (1/2 to 1 hour after eating). The symptoms have been associated with anorexia, early satiety, heartburn and use of alcohol,  while the symptoms have not been associated with bloating, bloody stools, black stools, diarrhea, dysuria, family history of colon cancer, fever, hematuria, jaundice, melena, nausea, passing worms, pica, weight loss or other. Note for \"Abdominal Pain\": Ptt has hx of NSAID use. PCP started pantoprazole yesterday. ADviced to d/c NSAIDs. HE has gallstones also on ct and 2.3 x 1.6 retroperitoneal mass from Stage 4 lung ca . Problem List/Past Medical Leo Sarwat Sheikh; 8/8/2019 8:55 AM)  Lung Cancer    Arthritis    COPD      Past Surgical History Leo Sheikh; 8/8/2019 8:55 AM)  H/O: hemorrhoidectomy (V45.89  Z98.890)      Allergies (Fuad Sheikh; 8/8/2019 8:55 AM)  Codeine/Codeine Derivatives      Medication History Leo Xiao; 8/8/2019 8:59 AM)  Linzess  (72MCG Capsule, 1 Oral daily) Active. ALPRAZolam  (0.25MG Tablet, 1 Oral bid) Active. Gabapentin  (200mg, 3 Oral daily) Specific strength unknown - Active. Pepcid  (1 Oral bid) Specific strength unknown - Active. Stool Softener & Laxative  (Oral bid) Specific strength unknown - Active. HYDROcodone-Acetaminophen  (5-325MG Tablet, 2-3 Oral daily) Active. Stiolto Respimat  (2.5-2.5MCG/ACT [employer], Inhalation bid) Active.   Ibuprofen & Acetaminophen  (600 & 500MG Tab Ther Pack, Oral prn) Active. Opdivo  (Intravenous) Specific strength unknown - Active. Medications Reconciled   Pantoprazole Sodium  (40MG Tablet DR, Oral) Active. Family History Ludivina Stokes; 8/8/2019 8:55 AM)  Lung Cancer   Mother. Social History Ludivina Stokes; 8/8/2019 8:55 AM)  Marital status   . Employment status   Retired. Alcohol Use   Has never drank. Tobacco Use   Uses chewing tobacco.  Blood Transfusion   No.    Diagnostic Studies History Ludivina Stokes; 8/8/2019 8:55 AM)  Colonoscopy  [2004]:  Endoscopy  [2004]: Health Maintenance History Ludivina Stokes; 8/8/2019 8:55 AM)  Flu Vaccine  [2018]:  Pneumovax  [2018]:        Review of Systems Ludivina Stokes; 8/8/2019 8:55 AM)  General Not Present- Chronic Fatigue, Poor Appetite, Weight Gain and Weight Loss. Skin Not Present- Itching, Rash and Skin Color Changes. HEENT Not Present- Hearing Loss and Vertigo. Respiratory Not Present- Difficulty Breathing and TB exposure. Cardiovascular Not Present- Chest Pain, Use of Antibiotics before Dental Procedures and Use of Blood Thinners. Gastrointestinal Present- See HPI. Musculoskeletal Not Present- Arthritis, Hip Replacement Surgery and Knee Replacement Surgery. Neurological Not Present- Weakness. Psychiatric Not Present- Depression. Endocrine Not Present- Diabetes and Thyroid Problems. Hematology Not Present- Anemia. Vitals Ludivina Stokes; 8/8/2019 8:57 AM)  8/8/2019 8:52 AM  Weight: 185.8 lb   Height: 71 in   Body Surface Area: 2.04 m²   Body Mass Index: 25.91 kg/m²    Pulse: 64 (Regular)     BP: 156/68 (Sitting, Left Arm, Standard)              Physical Exam Nathan Mendieta MD; 8/8/2019 9:18 AM)  General  Mental Status - Alert. General Appearance - Cooperative, Pleasant, Not in acute distress. Orientation - Oriented X3. Build & Nutrition - Well nourished and Well developed. Eye  Eyeball - Left - No Exophthalmos.   Eyeball - Right - No Exophthalmos. Sclera/Conjunctiva - Left - No Jaundice. Sclera/Conjunctiva - Right - No Jaundice. Chest and Lung Exam  Chest and lung exam reveals  - quiet, even and easy respiratory effort with no use of accessory muscles. Auscultation  Breath sounds - Normal. Adventitious sounds - No Adventitious sounds. Cardiovascular  Auscultation  Rhythm - Regular, No Tachycardia, No Bradycardia . Heart Sounds - Normal heart sounds , S1 WNL and S2 WNL, No S3, No Summation Gallop. Murmurs & Other Heart Sounds - Auscultation of the heart reveals - No Murmurs. Abdomen  Inspection  Inspection of the abdomen reveals - Non-distended. Palpation/Percussion  Tenderness - Non-Tender. Rebound tenderness - No rebound. Abdominal Mass Palpable - No masses. Other Characteristics - No Ascites. Organomegally - None. Auscultation  Auscultation of the abdomen reveals - Bowel sounds normal, No Abdominal bruits and No Succussion splash. Musculoskeletal  Global Assessment  Gait and Station - normal posture. Assessment & Plan Briseyda Clancy MD; 8/8/2019 9:24 AM)  Epigastric pain (789.06  R10.13)  Impression: recent use of daily NSAIDs. No overt GI bleeding. EGD indicated to r/o GERD , EoEo , PUD, HH, celiac disease, neoplastic or precancerous conditions vs other causes of GI limunal related dyspepsia. Pt aslo has gallsotnes on CT and retroperitoneal mass from stage 4 lung ca. Current Plans  Endoscopy (41699) (Discussed risks and benefits with the patient to include: perforation, post polypectomy, or post biopsy bleeding, missed lesions, and sedation reactions.)  Started Sucralfate 1GM/10ML, 10 Milliliter every six hours, 500 Milliliter, 21 days starting 08/08/2019, Ref. x2.  Gallstones (574.20  K80.20)  Impression: found on US , pt has postprandial stx. PRoceed with US r/o cholecystitis changes or biliary obstruction.   Current Plans  Abdominal Ultrasound (52733)  Pt Education - How to access health information online: discussed with patient and provided information. Patient is to call me for any questions or concerns.

## 2019-10-25 NOTE — ANESTHESIA POSTPROCEDURE EVALUATION
Procedure(s):  ESOPHAGOGASTRODUODENOSCOPY (EGD). MAC    Anesthesia Post Evaluation      Multimodal analgesia: multimodal analgesia used between 6 hours prior to anesthesia start to PACU discharge  Patient location during evaluation: PACU  Patient participation: complete - patient participated  Level of consciousness: awake  Pain score: 0  Pain management: adequate  Airway patency: patent  Anesthetic complications: no  Cardiovascular status: acceptable  Respiratory status: acceptable  Hydration status: acceptable  Post anesthesia nausea and vomiting:  none      No vitals data found for the desired time range.

## 2019-10-25 NOTE — DISCHARGE INSTRUCTIONS
Elvira Tom M.D.  [phone]           10/25/2019  Ashlyn Dominguez  :  1950  [facility] Medical Record Number:  [id number]        ENDOSCOPY FINDINGS:   Your endoscopy showed mild gastritis, otherwise looked within normal.      EGD DISCHARGE INSTRUCTIONS    DISCOMFORT:  Sore throat- throat lozenges or warm salt water gargle  redness at IV site- apply warm compress to area; if redness or soreness persist- contact your physician  Gaseous discomfort- walking, belching will help relieve any discomfort  You may not operate a vehicle for 12 hours  You may not engage in an occupation involving machinery or appliances for rest of today  You may not drink alcoholic beverages for at least 12 hours  Avoid making any critical decisions for at least 24 hour    DIET:   You may resume your regular diet. ACTIVITY  Spend the remainder of the day resting -  avoid any strenuous activity. Avoid driving or operating machinery. CALL M.D. ANY SIGN OF   Increasing pain, nausea, vomiting  Abdominal distension (swelling)  New increased bleeding (oral or rectal)  Fever (chills)  Pain in chest area  Bloody discharge from nose or mouth  Shortness of breath    Follow-up Instructions:   Call Dr. Margy Gil for any questions or problems. Telephone # [phone]  Biopsies were obtained, the results will be available  in  5 to 7 days. We will call you to notify you of these results. Continue same medications. Follow up in the office if symptoms are persistent.

## 2019-10-25 NOTE — ANESTHESIA PREPROCEDURE EVALUATION
Relevant Problems   No relevant active problems       Anesthetic History   No history of anesthetic complications            Review of Systems / Medical History  Patient summary reviewed    Pulmonary    COPD            Comments: Stage IV lung CA  COPD  Former smoker - 80 pack year history   Neuro/Psych         Neuromuscular disease and psychiatric history    Comments: Anxiety  Chronic pain, on duragesic patch and 10 mg hydrocodone 3-4 times per day (did not take this am)  Neuropathic pain Cardiovascular  Within defined limits                Exercise tolerance: <4 METS  Comments: Limited functional capacity due to DAVID, stable   GI/Hepatic/Renal     GERD: well controlled           Endo/Other  Within defined limits           Other Findings              Physical Exam    Airway  Mallampati: II  TM Distance: < 4 cm  Neck ROM: normal range of motion   Mouth opening: Normal     Cardiovascular    Rhythm: regular  Rate: normal         Dental    Dentition: Caps/crowns and Bridges     Pulmonary    Rhonchi:bilateral            Comments: Scant ronchi Abdominal         Other Findings            Anesthetic Plan    ASA: 3  Anesthesia type: MAC          Induction: Intravenous  Anesthetic plan and risks discussed with: Patient

## 2019-10-25 NOTE — PROCEDURES
Camilo Bowman M.D.  [phone]           10/25/2019                EGD Operative Report  Paul Donovan  :  1950  [facility] Medical Record Number:  [id number]      Indication:  Abdominal pain, epigastric     : Keary Fothergill, MD    Referring Provider:  Thu Mancera MD      Anesthesia/Sedation:  MAC anesthesia    Airway assessment: No airway problems anticipated    Pre-Procedural Exam:      Airway: clear, no airway problems anticipated  Heart: RRR, without gallops or rubs  Lungs: clear bilaterally without wheezes, crackles, or rhonchi  Abdomen: soft, nontender, nondistended, bowel sounds present  Mental Status: awake, alert and oriented to person, place and time       Procedure Details     After infomed consent was obtained for the procedure, with all risks and benefits of procedure explained the patient was taken to the endoscopy suite and placed in the left lateral decubitus position. Following sequential administration of sedation as per above, the endoscope was inserted into the mouth and advanced under direct vision to second portion of the duodenum. A careful inspection was made as the gastroscope was withdrawn, including a retroflexed view of the proximal stomach; findings and interventions are described below. Findings:   Esophagus:normal  Stomach: Mild erythema in a mosaic patter seen in the body of the stomach against the lesser curvature and mild erythema in the antrum, otherwise mucosa within normal. No ulcers or lesions seen. Biopsies were obtained. Duodenum/jejunum: Mucosa within normal throughout the duodenum, one small 1 cm submucosal lesion most consistent with lipoma was noted in the distal second portion. Therapies:  none    Specimens: Pyloritek and gastric           Complications:   None; patient tolerated the procedure well. EBL:  None.            Impression:    Mild gastritis     Recommendations:    - Continue with current acid suppression  - Follow-up on biopsy results  - Follow-up in the office if symptoms are persistent    Grabiel Anglin MD

## 2020-02-04 ENCOUNTER — OFFICE VISIT (OUTPATIENT)
Dept: RHEUMATOLOGY | Age: 70
End: 2020-02-04

## 2020-02-04 VITALS
TEMPERATURE: 97.3 F | OXYGEN SATURATION: 97 % | HEART RATE: 70 BPM | WEIGHT: 156.2 LBS | BODY MASS INDEX: 22.41 KG/M2 | SYSTOLIC BLOOD PRESSURE: 159 MMHG | DIASTOLIC BLOOD PRESSURE: 80 MMHG | RESPIRATION RATE: 16 BRPM

## 2020-02-04 DIAGNOSIS — M25.511 PAIN IN JOINT OF RIGHT SHOULDER: ICD-10-CM

## 2020-02-04 DIAGNOSIS — R76.8 HCV ANTIBODY POSITIVE: Primary | ICD-10-CM

## 2020-02-04 RX ORDER — PANTOPRAZOLE SODIUM 40 MG/1
TABLET, DELAYED RELEASE ORAL
COMMUNITY
Start: 2020-01-27

## 2020-02-04 RX ORDER — SUCRALFATE 1 G/1
TABLET ORAL
COMMUNITY
Start: 2020-01-27

## 2020-02-04 RX ORDER — TRIAMCINOLONE ACETONIDE 40 MG/ML
40 INJECTION, SUSPENSION INTRA-ARTICULAR; INTRAMUSCULAR ONCE
Qty: 1 ML | Refills: 0
Start: 2020-02-04 | End: 2020-02-04

## 2020-02-04 RX ORDER — LIDOCAINE HYDROCHLORIDE 10 MG/ML
1 INJECTION, SOLUTION EPIDURAL; INFILTRATION; INTRACAUDAL; PERINEURAL ONCE
Qty: 1 ML | Refills: 0
Start: 2020-02-04 | End: 2020-02-04

## 2020-02-04 RX ORDER — POLYETHYLENE GLYCOL 3350 17 G/17G
17 POWDER, FOR SOLUTION ORAL DAILY
COMMUNITY

## 2020-02-04 NOTE — PROGRESS NOTES
Chief Complaint   Patient presents with    Joint Pain     1. Have you been to the ER, urgent care clinic since your last visit? Hospitalized since your last visit? NO    2. Have you seen or consulted any other health care providers outside of the [address] [place] [address] since your last visit? Include any pap smears or colon screening. No
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
## (undated) DEVICE — KIT COLON W/ 1.1OZ LUB AND 2 END

## (undated) DEVICE — SET ADMIN 16ML TBNG L100IN 2 Y INJ SITE IV PIGGY BK DISP

## (undated) DEVICE — BITEBLOCK ENDOSCP 60FR MAXI WHT POLYETH STURDY W/ VELC WVN

## (undated) DEVICE — CATH IV AUTOGRD BC PNK 20GA 25 -- INSYTE

## (undated) DEVICE — BAG BELONG PT PERS CLEAR HANDL

## (undated) DEVICE — ELECTRODE,RADIOTRANSLUCENT,FOAM,3PK: Brand: MEDLINE

## (undated) DEVICE — Device

## (undated) DEVICE — 3M™ CUROS™ DISINFECTING CAP FOR NEEDLELESS CONNECTORS 270/CARTON 20 CARTONS/CASE CFF1-270: Brand: CUROS™

## (undated) DEVICE — FORCEPS BX L240CM JAW DIA2.8MM L CAP W/ NDL MIC MESH TOOTH

## (undated) DEVICE — CONTAINER SPEC 20 ML LID NEUT BUFF FORMALIN 10 % POLYPR STS

## (undated) DEVICE — SET GRAV CK VLV NEEDLESS ST 3 GANGED 4WAY STPCOCK HI FLO 10

## (undated) DEVICE — 1200 GUARD II KIT W/5MM TUBE W/O VAC TUBE: Brand: GUARDIAN

## (undated) DEVICE — CANN NASAL O2 CAPNOGRAPHY AD -- FILTERLINE

## (undated) DEVICE — SOLIDIFIER MEDC 1200ML -- CONVERT TO 356117

## (undated) DEVICE — BAG SPEC BIOHZRD 10 X 10 IN --